# Patient Record
Sex: FEMALE | Race: WHITE | NOT HISPANIC OR LATINO | Employment: OTHER | URBAN - METROPOLITAN AREA
[De-identification: names, ages, dates, MRNs, and addresses within clinical notes are randomized per-mention and may not be internally consistent; named-entity substitution may affect disease eponyms.]

---

## 2018-06-05 ENCOUNTER — HOSPITAL ENCOUNTER (OUTPATIENT)
Dept: FAMILY MEDICINE CLINIC | Facility: CLINIC | Age: 28
Setting detail: SPECIMEN
Discharge: HOME | End: 2018-06-05
Attending: FAMILY MEDICINE | Admitting: FAMILY MEDICINE

## 2018-06-05 LAB
ALBUMIN SERPL-MCNC: 3.9 G/DL (ref 3.5–4.8)
ALBUMIN/GLOB SERPL: 1.3 {RATIO} (ref 1–1.7)
ALP SERPL-CCNC: 36 IU/L (ref 32–91)
ALT SERPL-CCNC: 13 IU/L (ref 14–54)
ANION GAP SERPL CALC-SCNC: 9.4 MMOL/L (ref 10–20)
AST SERPL-CCNC: 17 IU/L (ref 15–41)
BASOPHILS # BLD AUTO: 0 10*3/UL (ref 0–0.2)
BASOPHILS NFR BLD AUTO: 1 % (ref 0–2)
BILIRUB SERPL-MCNC: 0.4 MG/DL (ref 0.3–1.2)
BUN SERPL-MCNC: 9 MG/DL (ref 8–20)
BUN/CREAT SERPL: 12.9 (ref 5.4–26.2)
CALCIUM SERPL-MCNC: 8.9 MG/DL (ref 8.9–10.3)
CHLORIDE SERPL-SCNC: 108 MMOL/L (ref 101–111)
CONV CO2: 25 MMOL/L (ref 22–32)
CONV TOTAL PROTEIN: 6.8 G/DL (ref 6.1–7.9)
CREAT UR-MCNC: 0.7 MG/DL (ref 0.4–1)
DIFFERENTIAL METHOD BLD: (no result)
EOSINOPHIL # BLD AUTO: 0.1 10*3/UL (ref 0–0.3)
EOSINOPHIL # BLD AUTO: 2 % (ref 0–3)
ERYTHROCYTE [DISTWIDTH] IN BLOOD BY AUTOMATED COUNT: 14.9 % (ref 11.5–14.5)
GLOBULIN UR ELPH-MCNC: 2.9 G/DL (ref 2.5–3.8)
GLUCOSE SERPL-MCNC: 102 MG/DL (ref 65–99)
HCT VFR BLD AUTO: 37.9 % (ref 35–49)
HGB BLD-MCNC: 12.5 G/DL (ref 12–15)
LYMPHOCYTES # BLD AUTO: 1.7 10*3/UL (ref 0.8–4.8)
LYMPHOCYTES NFR BLD AUTO: 28 % (ref 18–42)
MCH RBC QN AUTO: 28.4 PG (ref 26–32)
MCHC RBC AUTO-ENTMCNC: 32.9 G/DL (ref 32–36)
MCV RBC AUTO: 86.2 FL (ref 80–94)
MONOCYTES # BLD AUTO: 0.6 10*3/UL (ref 0.1–1.3)
MONOCYTES NFR BLD AUTO: 9 % (ref 2–11)
NEUTROPHILS # BLD AUTO: 3.7 10*3/UL (ref 2.3–8.6)
NEUTROPHILS NFR BLD AUTO: 60 % (ref 50–75)
NRBC BLD AUTO-RTO: 0 /100{WBCS}
NRBC/RBC NFR BLD MANUAL: 0 10*3/UL
PLATELET # BLD AUTO: 177 10*3/UL (ref 150–450)
PMV BLD AUTO: 10.9 FL (ref 7.4–10.4)
POTASSIUM SERPL-SCNC: 4.4 MMOL/L (ref 3.6–5.1)
RBC # BLD AUTO: 4.39 10*6/UL (ref 4–5.4)
SODIUM SERPL-SCNC: 138 MMOL/L (ref 136–144)
WBC # BLD AUTO: 6.1 10*3/UL (ref 4.5–11.5)

## 2018-07-18 ENCOUNTER — HOSPITAL ENCOUNTER (OUTPATIENT)
Dept: PREOP | Facility: HOSPITAL | Age: 28
Setting detail: HOSPITAL OUTPATIENT SURGERY
Discharge: HOME | End: 2018-07-18
Attending: INTERNAL MEDICINE | Admitting: INTERNAL MEDICINE

## 2018-08-20 ENCOUNTER — HOSPITAL ENCOUNTER (OUTPATIENT)
Dept: FAMILY MEDICINE CLINIC | Facility: CLINIC | Age: 28
Setting detail: SPECIMEN
Discharge: HOME | End: 2018-08-20
Attending: FAMILY MEDICINE | Admitting: FAMILY MEDICINE

## 2018-08-21 LAB
HBV SURFACE AB SER QL: REACTIVE
HBV SURFACE AB SER RIA-ACNC: ABNORMAL

## 2018-08-23 LAB
MEV IGG SER IA-ACNC: NORMAL
MUV IGG SER IA-ACNC: NORMAL
RUBV AB SER QL: NORMAL
VZV IGG SER IA-ACNC: NORMAL

## 2021-01-28 ENCOUNTER — OFFICE VISIT (OUTPATIENT)
Dept: FAMILY MEDICINE CLINIC | Facility: CLINIC | Age: 31
End: 2021-01-28

## 2021-01-28 ENCOUNTER — LAB (OUTPATIENT)
Dept: LAB | Facility: HOSPITAL | Age: 31
End: 2021-01-28

## 2021-01-28 VITALS
HEIGHT: 63 IN | HEART RATE: 77 BPM | SYSTOLIC BLOOD PRESSURE: 120 MMHG | WEIGHT: 145 LBS | DIASTOLIC BLOOD PRESSURE: 73 MMHG | BODY MASS INDEX: 25.69 KG/M2 | OXYGEN SATURATION: 99 % | TEMPERATURE: 98.6 F

## 2021-01-28 DIAGNOSIS — Z13.220 ENCOUNTER FOR LIPID SCREENING FOR CARDIOVASCULAR DISEASE: ICD-10-CM

## 2021-01-28 DIAGNOSIS — Z00.00 ENCOUNTER FOR WELL ADULT EXAM WITHOUT ABNORMAL FINDINGS: Primary | ICD-10-CM

## 2021-01-28 DIAGNOSIS — Z13.6 ENCOUNTER FOR LIPID SCREENING FOR CARDIOVASCULAR DISEASE: ICD-10-CM

## 2021-01-28 DIAGNOSIS — Z00.00 ENCOUNTER FOR WELL ADULT EXAM WITHOUT ABNORMAL FINDINGS: ICD-10-CM

## 2021-01-28 LAB
ALBUMIN SERPL-MCNC: 4.5 G/DL (ref 3.5–5.2)
ALBUMIN/GLOB SERPL: 1.8 G/DL
ALP SERPL-CCNC: 48 U/L (ref 39–117)
ALT SERPL W P-5'-P-CCNC: 28 U/L (ref 1–33)
ANION GAP SERPL CALCULATED.3IONS-SCNC: 9.4 MMOL/L (ref 5–15)
AST SERPL-CCNC: 20 U/L (ref 1–32)
BASOPHILS # BLD AUTO: 0.05 10*3/MM3 (ref 0–0.2)
BASOPHILS NFR BLD AUTO: 0.6 % (ref 0–1.5)
BILIRUB SERPL-MCNC: 0.2 MG/DL (ref 0–1.2)
BUN SERPL-MCNC: 9 MG/DL (ref 6–20)
BUN/CREAT SERPL: 13.6 (ref 7–25)
CALCIUM SPEC-SCNC: 9.9 MG/DL (ref 8.6–10.5)
CHLORIDE SERPL-SCNC: 102 MMOL/L (ref 98–107)
CHOLEST SERPL-MCNC: 194 MG/DL (ref 0–200)
CO2 SERPL-SCNC: 24.6 MMOL/L (ref 22–29)
CREAT SERPL-MCNC: 0.66 MG/DL (ref 0.57–1)
DEPRECATED RDW RBC AUTO: 40.2 FL (ref 37–54)
EOSINOPHIL # BLD AUTO: 0.17 10*3/MM3 (ref 0–0.4)
EOSINOPHIL NFR BLD AUTO: 1.9 % (ref 0.3–6.2)
ERYTHROCYTE [DISTWIDTH] IN BLOOD BY AUTOMATED COUNT: 12.8 % (ref 12.3–15.4)
GFR SERPL CREATININE-BSD FRML MDRD: 106 ML/MIN/1.73
GLOBULIN UR ELPH-MCNC: 2.5 GM/DL
GLUCOSE SERPL-MCNC: 84 MG/DL (ref 65–99)
HCT VFR BLD AUTO: 38.4 % (ref 34–46.6)
HDLC SERPL-MCNC: 55 MG/DL (ref 40–60)
HGB BLD-MCNC: 12.5 G/DL (ref 12–15.9)
IMM GRANULOCYTES # BLD AUTO: 0.04 10*3/MM3 (ref 0–0.05)
IMM GRANULOCYTES NFR BLD AUTO: 0.4 % (ref 0–0.5)
LDLC SERPL CALC-MCNC: 121 MG/DL (ref 0–100)
LDLC/HDLC SERPL: 2.17 {RATIO}
LYMPHOCYTES # BLD AUTO: 2 10*3/MM3 (ref 0.7–3.1)
LYMPHOCYTES NFR BLD AUTO: 22.3 % (ref 19.6–45.3)
MCH RBC QN AUTO: 28.2 PG (ref 26.6–33)
MCHC RBC AUTO-ENTMCNC: 32.6 G/DL (ref 31.5–35.7)
MCV RBC AUTO: 86.5 FL (ref 79–97)
MONOCYTES # BLD AUTO: 0.7 10*3/MM3 (ref 0.1–0.9)
MONOCYTES NFR BLD AUTO: 7.8 % (ref 5–12)
NEUTROPHILS NFR BLD AUTO: 6 10*3/MM3 (ref 1.7–7)
NEUTROPHILS NFR BLD AUTO: 67 % (ref 42.7–76)
NRBC BLD AUTO-RTO: 0 /100 WBC (ref 0–0.2)
PLATELET # BLD AUTO: 288 10*3/MM3 (ref 140–450)
PMV BLD AUTO: 11.5 FL (ref 6–12)
POTASSIUM SERPL-SCNC: 4 MMOL/L (ref 3.5–5.2)
PROT SERPL-MCNC: 7 G/DL (ref 6–8.5)
RBC # BLD AUTO: 4.44 10*6/MM3 (ref 3.77–5.28)
SODIUM SERPL-SCNC: 136 MMOL/L (ref 136–145)
TRIGL SERPL-MCNC: 99 MG/DL (ref 0–150)
VLDLC SERPL-MCNC: 18 MG/DL (ref 5–40)
WBC # BLD AUTO: 8.96 10*3/MM3 (ref 3.4–10.8)

## 2021-01-28 PROCEDURE — 85025 COMPLETE CBC W/AUTO DIFF WBC: CPT

## 2021-01-28 PROCEDURE — 99395 PREV VISIT EST AGE 18-39: CPT | Performed by: FAMILY MEDICINE

## 2021-01-28 PROCEDURE — 80061 LIPID PANEL: CPT

## 2021-01-28 PROCEDURE — 36415 COLL VENOUS BLD VENIPUNCTURE: CPT

## 2021-01-28 PROCEDURE — 80053 COMPREHEN METABOLIC PANEL: CPT

## 2021-01-28 NOTE — PROGRESS NOTES
Subjective   Chata Sun is a 30 y.o. female.     History of Present Illness   The patient comes in today for annual  physical.  The patient doing well denies fatigue, cold intolerance, headache, cough,chest pain, palpitations, dizziness, anxiety and SOB. The patient admits to dietary compliance is  fair  and exercising occasionally.  She is a non-smoker.      The following portions of the patient's history were reviewed and updated as appropriate: past medical history, past social history, past surgical history and problem list.    Review of Systems   Constitutional: Negative for activity change, appetite change, fatigue and fever.   HENT: Negative for congestion, ear pain, postnasal drip, sinus pressure, sore throat and trouble swallowing.    Eyes: Negative for blurred vision.   Respiratory: Negative for cough, shortness of breath and wheezing.    Cardiovascular: Negative for chest pain, palpitations and leg swelling.   Gastrointestinal: Negative for abdominal pain, diarrhea, nausea and vomiting.   Endocrine: Negative for cold intolerance, heat intolerance, polydipsia, polyphagia and polyuria.   Genitourinary: Negative for difficulty urinating, dysuria and frequency.   Musculoskeletal: Negative for back pain.   Neurological: Negative for dizziness and headache.   Psychiatric/Behavioral: Negative for sleep disturbance and depressed mood. The patient is not nervous/anxious.        Objective   Physical Exam  Vitals signs reviewed.   Constitutional:       General: She is not in acute distress.     Appearance: Normal appearance. She is well-developed.   HENT:      Right Ear: Tympanic membrane, ear canal and external ear normal.      Left Ear: Tympanic membrane, ear canal and external ear normal.   Eyes:      Conjunctiva/sclera: Conjunctivae normal.      Pupils: Pupils are equal, round, and reactive to light.   Neck:      Musculoskeletal: Normal range of motion and neck supple. No muscular tenderness.    Cardiovascular:      Rate and Rhythm: Normal rate.      Heart sounds: Normal heart sounds.   Pulmonary:      Effort: Pulmonary effort is normal.      Breath sounds: Normal breath sounds. No wheezing.   Abdominal:      General: Bowel sounds are normal.      Palpations: Abdomen is soft.      Tenderness: There is no abdominal tenderness.   Musculoskeletal: Normal range of motion.   Neurological:      General: No focal deficit present.      Mental Status: She is alert and oriented to person, place, and time.       Vitals:    01/28/21 0837   BP: 120/73   Pulse: 77   Temp: 98.6 °F (37 °C)   SpO2: 99%     No current outpatient medications on file prior to visit.     No current facility-administered medications on file prior to visit.            Assessment/Plan   Problems Addressed this Visit        Cardiac and Vasculature    Encounter for lipid screening for cardiovascular disease    Relevant Orders    Lipid panel       Health Encounters    Encounter for well adult exam without abnormal findings - Primary     Discussed preventive care protocol, healthy diet and  importance of regular exercise and recommend starting or continuing a regular exercise program for good health.  Patient is up-to-date with Pap with OB/GYN.  We will check fasting lipid panel, CMP and CBC.           Relevant Orders    Lipid panel    Comprehensive metabolic panel    CBC w AUTO Differential      Diagnoses       Codes Comments    Encounter for well adult exam without abnormal findings    -  Primary ICD-10-CM: Z00.00  ICD-9-CM: V70.0     Encounter for lipid screening for cardiovascular disease     ICD-10-CM: Z13.220, Z13.6  ICD-9-CM: V77.91, V81.2

## 2021-01-28 NOTE — ASSESSMENT & PLAN NOTE
Discussed preventive care protocol, healthy diet and  importance of regular exercise and recommend starting or continuing a regular exercise program for good health.  Patient is up-to-date with Pap with OB/GYN.  We will check fasting lipid panel, CMP and CBC.

## 2022-01-04 ENCOUNTER — TELEMEDICINE (OUTPATIENT)
Dept: FAMILY MEDICINE CLINIC | Facility: CLINIC | Age: 32
End: 2022-01-04

## 2022-01-04 DIAGNOSIS — R53.83 FATIGUE, UNSPECIFIED TYPE: Primary | ICD-10-CM

## 2022-01-04 DIAGNOSIS — R06.83 LOUD SNORING: ICD-10-CM

## 2022-01-04 PROCEDURE — 99213 OFFICE O/P EST LOW 20 MIN: CPT | Performed by: FAMILY MEDICINE

## 2022-01-04 NOTE — PROGRESS NOTES
Subjective   Chata Sun is a 31 y.o. female.     You have chosen to receive care through a telehealth visit.  Do you consent to use a video/audio connection for your medical care today? Yes.    Telehealth visit in view of COVID-19 pandemic.  Patient present with complaint of fatigue and loud snoring.  The symptoms noted for few months which are affecting on patient's mood making her irritable and anxious.  Patient stated when she wake up in the morning she is tired and whole day she has to drag at the work and for house chores.  She has a known history family history of sleep apnea.       The following portions of the patient's history were reviewed and updated as appropriate: past medical history, past social history, past surgical history and problem list.    Review of Systems   Constitutional: Positive for activity change and fatigue. Negative for appetite change and fever.   Respiratory: Negative for shortness of breath.    Neurological: Negative for dizziness, weakness and headache.        Loud snoring   Psychiatric/Behavioral: Positive for sleep disturbance. Negative for suicidal ideas. The patient is nervous/anxious.        Objective   Physical Exam  Pulmonary:      Effort: Pulmonary effort is normal.   Neurological:      Mental Status: She is alert and oriented to person, place, and time.   Psychiatric:         Mood and Affect: Mood normal.       No current outpatient medications on file prior to visit.     No current facility-administered medications on file prior to visit.           Assessment/Plan   Problems Addressed this Visit        Sleep    Loud snoring    Relevant Orders    Ambulatory Referral to Sleep Medicine       Symptoms and Signs    Fatigue - Primary     Patient is due for annual physical and  fasting lab advised to make appointment.  We will schedule for sleep study.    This is a video visit [employer] chase. was used to complete this visits. Total time of discussion was 20 minutes.          Relevant Orders    Ambulatory Referral to Sleep Medicine      Diagnoses       Codes Comments    Fatigue, unspecified type    -  Primary ICD-10-CM: R53.83  ICD-9-CM: 780.79     Loud snoring     ICD-10-CM: R06.83  ICD-9-CM: 786.09

## 2022-01-04 NOTE — ASSESSMENT & PLAN NOTE
Patient is due for annual physical and  fasting lab advised to make appointment.  We will schedule for sleep study.    This is a video visit [employer] chase. was used to complete this visits. Total time of discussion was 20 minutes.

## 2022-02-28 ENCOUNTER — OFFICE VISIT (OUTPATIENT)
Dept: SLEEP MEDICINE | Facility: CLINIC | Age: 32
End: 2022-02-28

## 2022-02-28 VITALS
BODY MASS INDEX: 25.69 KG/M2 | DIASTOLIC BLOOD PRESSURE: 75 MMHG | HEART RATE: 83 BPM | OXYGEN SATURATION: 97 % | WEIGHT: 145 LBS | SYSTOLIC BLOOD PRESSURE: 121 MMHG | HEIGHT: 63 IN

## 2022-02-28 DIAGNOSIS — G47.8 NON-RESTORATIVE SLEEP: ICD-10-CM

## 2022-02-28 DIAGNOSIS — R06.83 SNORING: ICD-10-CM

## 2022-02-28 DIAGNOSIS — E66.3 OVERWEIGHT WITH BODY MASS INDEX (BMI) 25.0-29.9: ICD-10-CM

## 2022-02-28 DIAGNOSIS — G47.10 HYPERSOMNIA: Primary | ICD-10-CM

## 2022-02-28 PROCEDURE — 99204 OFFICE O/P NEW MOD 45 MIN: CPT | Performed by: FAMILY MEDICINE

## 2022-02-28 PROCEDURE — G0463 HOSPITAL OUTPT CLINIC VISIT: HCPCS

## 2022-02-28 NOTE — PROGRESS NOTES
"[facility]Consultation       Reason for Consultation: Fatigue loud snoring      Patient Care Team:  Tyrese Chao MD as PCP - General (Family Medicine)  Francy Harkins MD as Consulting Physician (Sleep Medicine)      History of present illness:  Thank you for asking me to see your patient.  The patient is a 32 y.o. female With a significant past medical history presents with concern for sleep apnea.  Patient reports snoring hypersomnia nonrestorative sleep which is leading to irritability and anxiety.  There is a strong family history of sleep apnea.  No history of prior sleep study.  Patient reports snoring issues since high school.  Nonselectively 2008.  Also reports morning headaches waking with dry mouth waking at 30 pounds over the past 5 years nocturia up to 1 time a night more sleepy when she increases her sleep time.    ESS: 6    Social History: No tobacco use 1 alcoholic drink per week 2-3 caffeinated beverages a day no drug use    Allergies:  Patient has no known allergies.    Family History: JOSE ALBERTO yes     No current outpatient medications on file.    Vital Signs:    Vitals:    02/28/22 1324   BP: 121/75   BP Location: Left arm   Patient Position: Sitting   Cuff Size: Adult   Pulse: 83   SpO2: 97%   Weight: 65.8 kg (145 lb)   Height: 160 cm (63\")      Body mass index is 25.69 kg/m².         REVIEW OF SYSTEMS.  Full review of systems available on the intake form which is scanned in the media tab.  The relevant positive are noted below  1. Daytime excessive sleepiness with [place] Sleepiness Scale :Total score: 6   2. Snoring  3. Nasal congestion irregular heartbeat anxiety depression      Physical exam:  Vitals:    02/28/22 1324   BP: 121/75   BP Location: Left arm   Patient Position: Sitting   Cuff Size: Adult   Pulse: 83   SpO2: 97%   Weight: 65.8 kg (145 lb)   Height: 160 cm (63\")    Body mass index is 25.69 kg/m².    HEENT: Head is atraumatic, normocephalic  Eyes: pupils are " round equal and reacting to light and accommodation, conjunctiva normal  Nose: no nasal septal defects or deviation and the nasal passages are clear, no nasal polyps,  Throat: tongue normal, oral airway Mallampati class 3  NECK: , trachea is in the midline, thyroid not enlarged  RESPIRATORY SYSTEM: Breath sounds are equal on both sides, there are no wheezes   CARDIOVASULAR SYSTEM: Heart sounds are regular rhythm and chastity rate, no edema  EXTREMITES: No cyanosis, clubbing  NEUROLOGICAL SYSTEM: Oriented x 3, no gross motor defects, gait normal      Impression:  1. Hypersomnia    2. Non-restorative sleep    3. Snoring    4. Overweight with body mass index (BMI) 25.0-29.9        Plan:    Good sleep hygiene measures should be maintained.  Weight loss would be beneficial in this patient who is overweight BMI 25.7.    I discussed the pathophysiology of obstructive sleep apnea with the patient.  We discussed the adverse outcomes associated with untreated sleep-disordered breathing.  We discussed treatment modalities of obstructive sleep apnea including CPAP device as well as oral mandibular advancement device. Sleep study will be scheduled to establish definitive diagnosis of sleep disorder breathing.  Weight loss will be strongly beneficial in order to reduce the severity of sleep-disordered breathing.  Patient has narrow oropharyngeal structure.  Caution during activities that require prolonged concentration is strongly advised.  Patient will be notified of sleep study results after sleep study is completed.  If sleep apnea is only mild,  oral mandibular advancement device may be one of the treatment options.  However if sleep apnea is moderately severe, CPAP treatment will be strongly encouraged.  The patient is not opposed to treatment with CPAP device if we confirm significant obstructive sleep apnea on polysomnography.     Thank you for allowing me to participate in your patient's care.    Francy Harkins MD  Sleep  Medicine  02/28/22  14:02 EST

## 2022-03-24 ENCOUNTER — HOSPITAL ENCOUNTER (OUTPATIENT)
Dept: SLEEP MEDICINE | Facility: HOSPITAL | Age: 32
Discharge: HOME | End: 2022-03-24
Admitting: FAMILY MEDICINE

## 2022-03-24 DIAGNOSIS — E66.3 OVERWEIGHT WITH BODY MASS INDEX (BMI) 25.0-29.9: ICD-10-CM

## 2022-03-24 DIAGNOSIS — G47.8 NON-RESTORATIVE SLEEP: ICD-10-CM

## 2022-03-24 DIAGNOSIS — G47.10 HYPERSOMNIA: ICD-10-CM

## 2022-03-24 DIAGNOSIS — R06.83 SNORING: ICD-10-CM

## 2022-03-24 PROCEDURE — 95806 SLEEP STUDY UNATT&RESP EFFT: CPT

## 2022-03-24 PROCEDURE — 95806 SLEEP STUDY UNATT&RESP EFFT: CPT | Performed by: FAMILY MEDICINE

## 2022-04-04 DIAGNOSIS — G47.8 NON-RESTORATIVE SLEEP: ICD-10-CM

## 2022-04-04 DIAGNOSIS — G47.10 HYPERSOMNIA: ICD-10-CM

## 2022-04-04 DIAGNOSIS — E66.3 OVERWEIGHT WITH BODY MASS INDEX (BMI) 25.0-29.9: ICD-10-CM

## 2022-04-04 DIAGNOSIS — R06.83 SNORING: ICD-10-CM

## 2022-04-04 DIAGNOSIS — G47.33 OBSTRUCTIVE SLEEP APNEA: Primary | ICD-10-CM

## 2022-05-09 ENCOUNTER — OFFICE VISIT (OUTPATIENT)
Dept: FAMILY MEDICINE CLINIC | Facility: CLINIC | Age: 32
End: 2022-05-09

## 2022-05-09 VITALS
TEMPERATURE: 97.1 F | DIASTOLIC BLOOD PRESSURE: 78 MMHG | SYSTOLIC BLOOD PRESSURE: 120 MMHG | WEIGHT: 154 LBS | HEART RATE: 73 BPM | BODY MASS INDEX: 27.28 KG/M2 | OXYGEN SATURATION: 97 %

## 2022-05-09 DIAGNOSIS — J02.9 SORE THROAT: Primary | ICD-10-CM

## 2022-05-09 LAB
EXPIRATION DATE: NORMAL
INTERNAL CONTROL: NORMAL
Lab: NORMAL
S PYO AG THROAT QL: NEGATIVE

## 2022-05-09 PROCEDURE — 87880 STREP A ASSAY W/OPTIC: CPT | Performed by: FAMILY MEDICINE

## 2022-05-09 PROCEDURE — 99213 OFFICE O/P EST LOW 20 MIN: CPT | Performed by: FAMILY MEDICINE

## 2022-05-09 RX ORDER — AMOXICILLIN 500 MG/1
500 TABLET, FILM COATED ORAL 2 TIMES DAILY
Qty: 20 TABLET | Refills: 0 | Status: SHIPPED | OUTPATIENT
Start: 2022-05-09 | End: 2022-05-19

## 2022-05-09 NOTE — PROGRESS NOTES
Subjective   Chata Sun is a 32 y.o. female.     Sore Throat   This is a new problem. The current episode started in the past 7 days. The problem has been gradually worsening. There has been no fever. The pain is at a severity of 4/10. The pain is mild. Associated symptoms include congestion, swollen glands and trouble swallowing. Pertinent negatives include no abdominal pain, coughing, drooling, ear discharge, ear pain, headaches, hoarse voice, neck pain or shortness of breath. She has tried acetaminophen for the symptoms. The treatment provided mild relief.        The following portions of the patient's history were reviewed and updated as appropriate: past medical history, past social history, past surgical history and problem list.    Review of Systems   Constitutional: Positive for fatigue. Negative for fever.   HENT: Positive for congestion, sore throat, swollen glands and trouble swallowing. Negative for drooling, ear discharge, ear pain, hoarse voice and sinus pressure.    Respiratory: Negative for cough and shortness of breath.    Gastrointestinal: Negative for abdominal pain.   Musculoskeletal: Negative for neck pain.   Neurological: Negative for headache.       Objective   Physical Exam  Vitals reviewed.   Constitutional:       General: She is not in acute distress.  HENT:      Right Ear: Tympanic membrane, ear canal and external ear normal.      Left Ear: Tympanic membrane, ear canal and external ear normal.      Nose: Congestion present.      Mouth/Throat:      Pharynx: Posterior oropharyngeal erythema present.   Eyes:      Conjunctiva/sclera: Conjunctivae normal.   Pulmonary:      Effort: Pulmonary effort is normal.      Breath sounds: Normal breath sounds.   Musculoskeletal:      Cervical back: Normal range of motion and neck supple. No tenderness.   Lymphadenopathy:      Cervical: Cervical adenopathy present.   Neurological:      Mental Status: She is alert and oriented to person, place, and  time.       Vitals:    05/09/22 1420   BP: 120/78   Pulse: 73   Temp: 97.1 °F (36.2 °C)   SpO2: 97%     No current outpatient medications on file prior to visit.     No current facility-administered medications on file prior to visit.           Assessment & Plan   Problems Addressed this Visit        ENT    Sore throat - Primary     Discussed conservative management, Tylenol and salt water gargles.           Relevant Medications    amoxicillin (AMOXIL) 500 MG tablet    Other Relevant Orders    POCT rapid strep A (Completed)      Diagnoses       Codes Comments    Sore throat    -  Primary ICD-10-CM: J02.9  ICD-9-CM: 462

## 2022-05-12 PROBLEM — J02.9 SORE THROAT: Status: ACTIVE | Noted: 2022-05-12

## 2022-05-23 ENCOUNTER — OFFICE VISIT (OUTPATIENT)
Dept: FAMILY MEDICINE CLINIC | Facility: CLINIC | Age: 32
End: 2022-05-23

## 2022-05-23 VITALS
OXYGEN SATURATION: 98 % | BODY MASS INDEX: 27.43 KG/M2 | TEMPERATURE: 98.6 F | DIASTOLIC BLOOD PRESSURE: 79 MMHG | SYSTOLIC BLOOD PRESSURE: 122 MMHG | RESPIRATION RATE: 16 BRPM | HEART RATE: 73 BPM | WEIGHT: 154.8 LBS | HEIGHT: 63 IN

## 2022-05-23 DIAGNOSIS — Z13.6 ENCOUNTER FOR LIPID SCREENING FOR CARDIOVASCULAR DISEASE: ICD-10-CM

## 2022-05-23 DIAGNOSIS — Z13.220 ENCOUNTER FOR LIPID SCREENING FOR CARDIOVASCULAR DISEASE: ICD-10-CM

## 2022-05-23 DIAGNOSIS — Z00.00 ENCOUNTER FOR WELL ADULT EXAM WITHOUT ABNORMAL FINDINGS: Primary | ICD-10-CM

## 2022-05-23 PROCEDURE — 99395 PREV VISIT EST AGE 18-39: CPT | Performed by: FAMILY MEDICINE

## 2022-05-23 PROCEDURE — 2014F MENTAL STATUS ASSESS: CPT | Performed by: FAMILY MEDICINE

## 2022-05-23 PROCEDURE — 3008F BODY MASS INDEX DOCD: CPT | Performed by: FAMILY MEDICINE

## 2022-05-23 NOTE — PROGRESS NOTES
Subjective   Chata Sun is a 32 y.o. female.     History of Present Illness     The patient comes in today for annua physical.  The patient is  doing well denies anxiety, depression, trouble sleeping, abdominal pain, urinary symptoms, nausea, vomiting, fatigue, cold intolerance, headache, cough,chest pain, palpitations, dizziness and SOB. The patient admits recent dietary changes and exercising occasionally.  She is a non-smoker.      The following portions of the patient's history were reviewed and updated as appropriate: past medical history, past social history, past surgical history and problem list.    Review of Systems   Constitutional: Negative for activity change, appetite change, fatigue and fever.   HENT: Negative for sinus pressure, sore throat and trouble swallowing.    Eyes: Negative for visual disturbance.   Respiratory: Negative for cough, shortness of breath and wheezing.    Cardiovascular: Negative for chest pain and palpitations.   Gastrointestinal: Negative for abdominal pain, constipation, diarrhea and indigestion.   Endocrine: Negative for cold intolerance and polyphagia.   Genitourinary: Negative for dysuria and flank pain.   Neurological: Negative for headache.   Psychiatric/Behavioral: Negative for sleep disturbance and depressed mood. The patient is not nervous/anxious.        Objective   Physical Exam  Vitals reviewed.   Constitutional:       General: She is not in acute distress.     Appearance: She is well-developed.   HENT:      Right Ear: Tympanic membrane, ear canal and external ear normal.      Left Ear: Tympanic membrane, ear canal and external ear normal.   Neck:      Thyroid: No thyromegaly.   Cardiovascular:      Rate and Rhythm: Normal rate.      Heart sounds: Normal heart sounds.   Pulmonary:      Effort: Pulmonary effort is normal.      Breath sounds: Normal breath sounds. No wheezing.   Abdominal:      General: Bowel sounds are normal.      Palpations: Abdomen is soft.       Tenderness: There is no abdominal tenderness.   Musculoskeletal:         General: Normal range of motion.      Cervical back: Normal range of motion and neck supple. No tenderness.   Neurological:      Mental Status: She is alert and oriented to person, place, and time.   Psychiatric:         Mood and Affect: Mood normal.       Vitals:    05/23/22 1422   BP: 122/79   Pulse: 73   Resp: 16   Temp: 98.6 °F (37 °C)   SpO2: 98%     Body mass index is 27.42 kg/m².      Assessment & Plan   Problems Addressed this Visit        Cardiac and Vasculature    Encounter for lipid screening for cardiovascular disease    Relevant Orders    Lipid panel       Health Encounters    Encounter for well adult exam without abnormal findings - Primary     Discussed healthy diet and  importance of regular exercise and recommend starting or continuing a regular exercise program for good health.  Stressed importance of moderation in sodium/caffeine intake, saturated fat and cholesterol, caloric balance, sufficient intake of fresh fruits, vegetables, fiber, calcium and iron.             Relevant Orders    Lipid panel    TSH    Comprehensive metabolic panel    CBC w AUTO Differential      Diagnoses       Codes Comments    Encounter for well adult exam without abnormal findings    -  Primary ICD-10-CM: Z00.00  ICD-9-CM: V70.0     Encounter for lipid screening for cardiovascular disease     ICD-10-CM: Z13.220, Z13.6  ICD-9-CM: V77.91, V81.2

## 2022-05-27 ENCOUNTER — LAB (OUTPATIENT)
Dept: LAB | Facility: HOSPITAL | Age: 32
End: 2022-05-27

## 2022-05-27 DIAGNOSIS — Z13.220 ENCOUNTER FOR LIPID SCREENING FOR CARDIOVASCULAR DISEASE: ICD-10-CM

## 2022-05-27 DIAGNOSIS — Z00.00 ENCOUNTER FOR WELL ADULT EXAM WITHOUT ABNORMAL FINDINGS: ICD-10-CM

## 2022-05-27 DIAGNOSIS — Z13.6 ENCOUNTER FOR LIPID SCREENING FOR CARDIOVASCULAR DISEASE: ICD-10-CM

## 2022-05-27 LAB
ALBUMIN SERPL-MCNC: 4.4 G/DL (ref 3.5–5.2)
ALBUMIN/GLOB SERPL: 1.8 G/DL
ALP SERPL-CCNC: 51 U/L (ref 39–117)
ALT SERPL W P-5'-P-CCNC: 33 U/L (ref 1–33)
ANION GAP SERPL CALCULATED.3IONS-SCNC: 10.8 MMOL/L (ref 5–15)
AST SERPL-CCNC: 24 U/L (ref 1–32)
BASOPHILS # BLD AUTO: 0.05 10*3/MM3 (ref 0–0.2)
BASOPHILS NFR BLD AUTO: 0.8 % (ref 0–1.5)
BILIRUB SERPL-MCNC: 0.3 MG/DL (ref 0–1.2)
BUN SERPL-MCNC: 10 MG/DL (ref 6–20)
BUN/CREAT SERPL: 15.6 (ref 7–25)
CALCIUM SPEC-SCNC: 9.4 MG/DL (ref 8.6–10.5)
CHLORIDE SERPL-SCNC: 107 MMOL/L (ref 98–107)
CHOLEST SERPL-MCNC: 216 MG/DL (ref 0–200)
CO2 SERPL-SCNC: 21.2 MMOL/L (ref 22–29)
CREAT SERPL-MCNC: 0.64 MG/DL (ref 0.57–1)
DEPRECATED RDW RBC AUTO: 44.8 FL (ref 37–54)
EGFRCR SERPLBLD CKD-EPI 2021: 121.3 ML/MIN/1.73
EOSINOPHIL # BLD AUTO: 0.39 10*3/MM3 (ref 0–0.4)
EOSINOPHIL NFR BLD AUTO: 6.4 % (ref 0.3–6.2)
ERYTHROCYTE [DISTWIDTH] IN BLOOD BY AUTOMATED COUNT: 13.5 % (ref 12.3–15.4)
GLOBULIN UR ELPH-MCNC: 2.4 GM/DL
GLUCOSE SERPL-MCNC: 97 MG/DL (ref 65–99)
HCT VFR BLD AUTO: 39.3 % (ref 34–46.6)
HDLC SERPL-MCNC: 42 MG/DL (ref 40–60)
HGB BLD-MCNC: 12.8 G/DL (ref 12–15.9)
IMM GRANULOCYTES # BLD AUTO: 0.03 10*3/MM3 (ref 0–0.05)
IMM GRANULOCYTES NFR BLD AUTO: 0.5 % (ref 0–0.5)
LDLC SERPL CALC-MCNC: 151 MG/DL (ref 0–100)
LDLC/HDLC SERPL: 3.53 {RATIO}
LYMPHOCYTES # BLD AUTO: 1.58 10*3/MM3 (ref 0.7–3.1)
LYMPHOCYTES NFR BLD AUTO: 26.1 % (ref 19.6–45.3)
MCH RBC QN AUTO: 29.2 PG (ref 26.6–33)
MCHC RBC AUTO-ENTMCNC: 32.6 G/DL (ref 31.5–35.7)
MCV RBC AUTO: 89.7 FL (ref 79–97)
MONOCYTES # BLD AUTO: 0.48 10*3/MM3 (ref 0.1–0.9)
MONOCYTES NFR BLD AUTO: 7.9 % (ref 5–12)
NEUTROPHILS NFR BLD AUTO: 3.53 10*3/MM3 (ref 1.7–7)
NEUTROPHILS NFR BLD AUTO: 58.3 % (ref 42.7–76)
NRBC BLD AUTO-RTO: 0 /100 WBC (ref 0–0.2)
PLATELET # BLD AUTO: 241 10*3/MM3 (ref 140–450)
PMV BLD AUTO: 12.1 FL (ref 6–12)
POTASSIUM SERPL-SCNC: 4.3 MMOL/L (ref 3.5–5.2)
PROT SERPL-MCNC: 6.8 G/DL (ref 6–8.5)
RBC # BLD AUTO: 4.38 10*6/MM3 (ref 3.77–5.28)
SODIUM SERPL-SCNC: 139 MMOL/L (ref 136–145)
TRIGL SERPL-MCNC: 129 MG/DL (ref 0–150)
TSH SERPL DL<=0.05 MIU/L-ACNC: 1.65 UIU/ML (ref 0.27–4.2)
VLDLC SERPL-MCNC: 23 MG/DL (ref 5–40)
WBC NRBC COR # BLD: 6.06 10*3/MM3 (ref 3.4–10.8)

## 2022-05-27 PROCEDURE — 36415 COLL VENOUS BLD VENIPUNCTURE: CPT

## 2022-05-27 PROCEDURE — 80050 GENERAL HEALTH PANEL: CPT

## 2022-05-27 PROCEDURE — 80061 LIPID PANEL: CPT

## 2023-01-09 ENCOUNTER — OFFICE VISIT (OUTPATIENT)
Dept: SLEEP MEDICINE | Facility: CLINIC | Age: 33
End: 2023-01-09
Payer: MEDICAID

## 2023-01-09 VITALS
HEIGHT: 63 IN | SYSTOLIC BLOOD PRESSURE: 142 MMHG | HEART RATE: 98 BPM | OXYGEN SATURATION: 98 % | DIASTOLIC BLOOD PRESSURE: 75 MMHG | BODY MASS INDEX: 27.42 KG/M2 | WEIGHT: 154.76 LBS

## 2023-01-09 DIAGNOSIS — E66.3 OVERWEIGHT WITH BODY MASS INDEX (BMI) 25.0-29.9: ICD-10-CM

## 2023-01-09 DIAGNOSIS — G47.10 HYPERSOMNIA: ICD-10-CM

## 2023-01-09 DIAGNOSIS — G47.8 NON-RESTORATIVE SLEEP: ICD-10-CM

## 2023-01-09 DIAGNOSIS — R06.83 SNORING: ICD-10-CM

## 2023-01-09 DIAGNOSIS — G47.33 OBSTRUCTIVE SLEEP APNEA: Primary | ICD-10-CM

## 2023-01-09 PROCEDURE — G0463 HOSPITAL OUTPT CLINIC VISIT: HCPCS

## 2023-01-09 PROCEDURE — 99213 OFFICE O/P EST LOW 20 MIN: CPT | Performed by: FAMILY MEDICINE

## 2023-01-16 ENCOUNTER — TELEPHONE (OUTPATIENT)
Dept: SLEEP MEDICINE | Facility: CLINIC | Age: 33
End: 2023-01-16
Payer: MEDICAID

## 2023-01-16 NOTE — TELEPHONE ENCOUNTER
"Patient's prev PAP (recently turned back in to [facility] due to noncompliance) was a Rhona device.  New order says \"no Rhona machines\".  [facility] is asking if they can return patient's previous Rhona machine rather than give her a new Resmed because the previous machine had to be quarantined due to smell of cigarette smoke.  "

## 2023-01-19 ENCOUNTER — APPOINTMENT (OUTPATIENT)
Dept: CT IMAGING | Facility: HOSPITAL | Age: 33
End: 2023-01-19
Payer: MEDICAID

## 2023-01-19 ENCOUNTER — HOSPITAL ENCOUNTER (EMERGENCY)
Facility: HOSPITAL | Age: 33
Discharge: HOME | End: 2023-01-19
Attending: EMERGENCY MEDICINE | Admitting: EMERGENCY MEDICINE
Payer: MEDICAID

## 2023-01-19 VITALS
WEIGHT: 153.44 LBS | OXYGEN SATURATION: 100 % | RESPIRATION RATE: 14 BRPM | HEART RATE: 75 BPM | TEMPERATURE: 97.8 F | HEIGHT: 62 IN | BODY MASS INDEX: 28.24 KG/M2 | DIASTOLIC BLOOD PRESSURE: 86 MMHG | SYSTOLIC BLOOD PRESSURE: 152 MMHG

## 2023-01-19 DIAGNOSIS — R51.9 NONINTRACTABLE HEADACHE, UNSPECIFIED CHRONICITY PATTERN, UNSPECIFIED HEADACHE TYPE: ICD-10-CM
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
## 2023-01-19 DIAGNOSIS — S00.83XA CONTUSION OF FACE, INITIAL ENCOUNTER: Primary | ICD-10-CM

## 2023-01-19 PROCEDURE — 70486 CT MAXILLOFACIAL W/O DYE: CPT

## 2023-01-19 PROCEDURE — 99283 EMERGENCY DEPT VISIT LOW MDM: CPT

## 2023-01-19 PROCEDURE — 70450 CT HEAD/BRAIN W/O DYE: CPT

## 2023-01-19 RX ORDER — ACETAMINOPHEN 500 MG
1000 TABLET ORAL ONCE
Status: COMPLETED | OUTPATIENT
Start: 2023-01-19 | End: 2023-01-19

## 2023-01-19 RX ADMIN — ACETAMINOPHEN 1000 MG: 500 TABLET ORAL at 21:28

## 2023-01-20 NOTE — DISCHARGE INSTRUCTIONS
Tylenol as needed for pain.  Apply ice to your chin to help with pain and swelling    Follow-up with primary care for recheck  Return to the ER for new or worsening symptoms  Head injury precautions.

## 2023-01-20 NOTE — ED PROVIDER NOTES
"Subjective      Provider in Triage Note  Patient is a 32 year old  female with history of depression, presents to the ER with complaints of head injury 2 days ago.  Patient states that she was helping to carry a sleeper sofa with one of her friends going up the stairs, she states that she tripped fell smacking her chin on the sofa.  Patient states that she blacked out for a few seconds and \"saw stars\".  Patient is complaining of pain and swelling to the left side of her jaw.  She also complaining of a headache and pain to the top of her head.  She describes it as a constant achy throbbing pain, worse yesterday but today only rates a 3/10.  She reports feeling nauseous, lightheaded with a headache.  No vomiting, no diarrhea.  No upper extremity weakness or paresthesias.  She does report some photophobia.  No fever chills.  No difficulty with speech.      History of Present Illness  This provider completed above pit note.  Agree with above HPI.    History provided by:  Patient      Review of Systems   Constitutional: Negative for chills and fever.   HENT: Positive for facial swelling. Negative for sore throat and trouble swallowing.    Eyes: Positive for photophobia.   Respiratory: Negative for shortness of breath and wheezing.    Cardiovascular: Negative for chest pain.   Gastrointestinal: Positive for nausea. Negative for abdominal pain.   Endocrine: Negative.    Genitourinary: Negative for dysuria.   Skin: Negative for rash.   Allergic/Immunologic: Negative.    Neurological: Positive for light-headedness and headaches. Negative for weakness.   Psychiatric/Behavioral: Negative for behavioral problems.   All other systems reviewed and are negative.      Past Medical History:   Diagnosis Date   • Depression        No Known Allergies    Past Surgical History:   Procedure Laterality Date   • ADENOIDECTOMY     • HAND TENDON SURGERY Right 2003   • TONSILLECTOMY         Family History   Problem Relation Age of " Onset   • Mental illness Mother    • Diabetes Father    • Mental illness Brother    • Miscarriages / Stillbirths Paternal Grandmother    • Cancer Paternal Grandfather    • Heart disease Paternal Grandfather    • Miscarriages / Stillbirths Paternal Grandfather        Social History     Socioeconomic History   • Marital status:    Tobacco Use   • Smoking status: Never   • Smokeless tobacco: Never   Substance and Sexual Activity   • Alcohol use: Yes     Alcohol/week: 1.0 standard drink     Types: 1 Glasses of wine per week   • Drug use: Never   • Sexual activity: Defer           Objective   Physical Exam  Vitals and nursing note reviewed.   Constitutional:       Appearance: Normal appearance. She is well-developed and normal weight. She is not ill-appearing or toxic-appearing.   HENT:      Head: Normocephalic.      Comments: Mild swelling left lower jaw with some bruising, no abrasion laceration or open wound.  No bleeding or drainage.  No asymmetry.     Right Ear: Tympanic membrane normal.      Left Ear: Tympanic membrane normal.      Nose: Nose normal. No congestion.      Mouth/Throat:      Mouth: Mucous membranes are moist.   Eyes:      Extraocular Movements: Extraocular movements intact.      Conjunctiva/sclera: Conjunctivae normal.      Pupils: Pupils are equal, round, and reactive to light.   Neck:      Comments: Cervical spine: No midline tenderness to palpation. No step-offs or deformities. Pain-free range of motion.  Cardiovascular:      Rate and Rhythm: Normal rate and regular rhythm.      Pulses: Normal pulses.      Heart sounds: Normal heart sounds. No murmur heard.  Pulmonary:      Effort: Pulmonary effort is normal. No respiratory distress.      Breath sounds: Normal breath sounds. No wheezing.   Abdominal:      General: Bowel sounds are normal. There is no distension.      Palpations: Abdomen is soft.      Tenderness: There is no abdominal tenderness.   Musculoskeletal:         General: Normal  "range of motion.      Cervical back: Normal range of motion. No tenderness.   Lymphadenopathy:      Cervical: No cervical adenopathy.   Skin:     General: Skin is warm and dry.      Capillary Refill: Capillary refill takes less than 2 seconds.      Findings: Bruising present. No erythema or rash.   Neurological:      General: No focal deficit present.      Mental Status: She is alert and oriented to person, place, and time.      Cranial Nerves: No cranial nerve deficit.      Motor: No weakness.   Psychiatric:         Mood and Affect: Mood normal.         Behavior: Behavior normal.         Procedures           ED Course    /85   Pulse 81   Temp 97.8 °F (36.6 °C)   Resp 16   Ht 157.5 cm (62\")   Wt 69.6 kg (153 lb 7 oz)   LMP 01/12/2023 (Approximate)   SpO2 98%   BMI 28.06 kg/m²   Labs Reviewed - No data to display  Medications   acetaminophen (TYLENOL) tablet 1,000 mg (has no administration in time range)     CT Head Without Contrast    Result Date: 1/19/2023  No acute intracranial abnormality. Minimal paranasal sinus disease  Electronically Signed: Luis Owens  1/19/2023 8:27 PM EST  Workstation ID: [id number]    CT Facial Bones Without Contrast    Result Date: 1/19/2023  Impression: Small contusion along the undersurface of the channel the left. No underlying acute osseous abnormality noted Electronically Signed: Luis Owens  1/19/2023 8:30 PM EST  Workstation ID: [id number]                                           Medical Decision Making  MEDICAL DECISION  Epic Chart Review: No recent admissions  Comorbidities: Patient denies  Differentials: Fracture contusion, dislocation of the jaw, intracranial hemorrhage; this list is not all inclusive and does not constitute the entirety of considered causes  Radiology interpretation:  Images reviewed by me and interpreted by radiologist, as above  Lab interpretation:  Labs viewed by me significant for, not warranted    While in the ED  appropriate PPE was " worn during exam and throughout all encounters with the patient.  Patient had the above evaluation.  Patient given Tylenol for headache.  Patient neurological exam is grossly normal she is awake alert and orient x4 with no focal neurological deficit.  She is moving all extremities independently.  Speech is clear.  CT head shows no acute intracranial abnormalities.  CT facial bones shows small contusion along the undersurface of the channel near the left with no acute osseous abnormality.  Findings discussed with patient at bedside.  Neurological exam remained normal.  She may have mild concussive syndrome.  Patient was counseled on head injury precautions.    Discharge plan and instructions were discussed with the patient who verbalized understanding and is in agreement with the plan, all questions were answered at this time.  Patient is aware of signs symptoms that would require immediate return to the emergency room.  Patient understands importance of following up with primary care provider for further evaluation and worsening concerns as well as blood pressure recheck in the next 4 weeks.    Patient was discharged in improved stable condition with an upright steady gait.    Patient is aware that discharge does not mean that nothing is wrong but indicates no emergencies present and they must continue care with follow-up as given below or physician of her choice.      Contusion of face, initial encounter: acute illness or injury  Fall, initial encounter: acute illness or injury  Nonintractable headache, unspecified chronicity pattern, unspecified headache type: acute illness or injury  Amount and/or Complexity of Data Reviewed  Radiology: ordered.      Risk  OTC drugs.          Final diagnoses:   Contusion of face, initial encounter   Nonintractable headache, unspecified chronicity pattern, unspecified headache type   Fall, initial encounter       ED Disposition  ED Disposition     ED Disposition   Discharge     Condition   Stable    Comment   --             Tyrese Chao MD  [address] [address]  [phone]    Schedule an appointment as soon as possible for a visit in 2 days  As needed, If symptoms worsen         Medication List      No changes were made to your prescriptions during this visit.          Cherelle West PA  01/19/23 [id number]

## 2023-05-04 DIAGNOSIS — Z80.0 FAMILY HISTORY OF COLON CANCER: Primary | ICD-10-CM

## 2025-04-25 NOTE — PROGRESS NOTES
Dr Herrera to bedside to access pt   Follow Up [facility] Note     Chief Complaint:  JOSE ALBERTO     Primary Care Physician: Tyrese Chao MD    Interval History:   The patient is a 32 y.o. female  who was last seen in the sleep lab: For home sleep study.  Tried Pap in the past however had tolerance issues and Pap was taken back.  Patient like to get back into care.  Very motivated to try again. Last study was home study I had ordered in March 2022.  Showed overall AHI of 5.4 events per hour advised auto CPAP 6-15 cm H2O.  Patient has had 9 pound weight gain since then.  Difficulty with tolerating mask; only had one mask fitting.  May do better with nasal mask with chinstrap or different type of fullface mask.  On nights where she can keep it on for longer without taking it off without realizing it feels a lot better the next day.    Review of Systems:    A complete review of systems was done and all were negative with the exception of negative    Social History:    Social History     Socioeconomic History   • Marital status:    Tobacco Use   • Smoking status: Never   • Smokeless tobacco: Never   Substance and Sexual Activity   • Alcohol use: Yes     Alcohol/week: 1.0 standard drink     Types: 1 Glasses of wine per week   • Drug use: Never   • Sexual activity: Defer       Allergies:  Patient has no known allergies.     Medication Review:  Reviewed.      Vital Signs:    Vitals:    01/09/23 1335   BP: 142/75   BP Location: Left arm   Patient Position: Sitting   Cuff Size: Adult   Pulse: 98   SpO2: 98%   Weight: 70.2 kg (154 lb 12.2 oz)   Height: 160 cm (62.99\")     Body mass index is 27.42 kg/m².    Physical Exam:    Constitutional:  Well developed 32 y.o. female that appears in no apparent distress.  Awake & oriented times 3.  Normal mood with normal recent and remote memory and normal judgement.  Eyes:  Conjunctivae normal.  Oropharynx: Previously, moist mucous membranes, patient is wearing a facemask.    Self-administered [place]  Sleepiness Scale test results: 3  0-5 Lower normal daytime sleepiness  6-10 Higher normal daytime sleepiness  11-12 Mild, 13-15 Moderate, & 16-24 Severe excessive daytime sleepiness    Impression:   1. Obstructive sleep apnea    2. Overweight with body mass index (BMI) 25.0-29.9    3. Hypersomnia    4. Non-restorative sleep    5. Snoring        Patient is to get back into care for her obstructive sleep apnea with auto CPAP machine.  Very motivated.    Plan:  Good sleep hygiene measures should be maintained.  Weight loss would be beneficial in this patient who overweight by Body mass index is 27.42 kg/m²..      After evaluating the patient and assessing results available, the patient is benefiting from the treatment being provided.     The patient will continue auto CPAP 6-15 cm H2O.  Order placed for machine to get back into care.  Discussed mask fitting and conditioning in great detail.  May do better with nasal mask and chinstrap or different type of fullface mask.    Caution during activities that require prolonged concentration is strongly advised if sleepiness returns. Changing of PAP supplies regularly is important for effective use. Patient needs to change cushion on the mask or plugs on nasal pillows along with disposable filters once every month and change mask frame, tubing, headgear and Velcro straps every 6 months at the minimum.    I answered all of the patient's questions.  The patient will call for any problems and will follow up after at least 1 month use of Pap.      Francy Harkins MD  Sleep Medicine  01/09/23  13:53 EST